# Patient Record
Sex: FEMALE | Race: WHITE | NOT HISPANIC OR LATINO | Employment: STUDENT | URBAN - METROPOLITAN AREA
[De-identification: names, ages, dates, MRNs, and addresses within clinical notes are randomized per-mention and may not be internally consistent; named-entity substitution may affect disease eponyms.]

---

## 2023-08-10 ENCOUNTER — HOSPITAL ENCOUNTER (OUTPATIENT)
Dept: DATA CONVERSION | Facility: HOSPITAL | Age: 23
Discharge: HOME | End: 2023-08-10

## 2023-08-10 DIAGNOSIS — N92.0 EXCESSIVE AND FREQUENT MENSTRUATION WITH REGULAR CYCLE: ICD-10-CM

## 2023-09-11 ENCOUNTER — HOSPITAL ENCOUNTER (OUTPATIENT)
Dept: DATA CONVERSION | Facility: HOSPITAL | Age: 23
Discharge: HOME | End: 2023-09-11
Payer: COMMERCIAL

## 2023-09-11 DIAGNOSIS — E55.9 VITAMIN D DEFICIENCY, UNSPECIFIED: ICD-10-CM

## 2023-09-11 DIAGNOSIS — Z13.220 ENCOUNTER FOR SCREENING FOR LIPOID DISORDERS: ICD-10-CM

## 2023-09-11 DIAGNOSIS — Z13.1 ENCOUNTER FOR SCREENING FOR DIABETES MELLITUS: ICD-10-CM

## 2023-09-11 LAB
25(OH)D3 SERPL-MCNC: 22 NG/ML (ref 31–100)
ALBUMIN SERPL-MCNC: 4.7 GM/DL (ref 3.5–5)
ALBUMIN/GLOB SERPL: 2 RATIO (ref 1.5–3)
ALP BLD-CCNC: 66 U/L (ref 35–125)
ALT SERPL-CCNC: 8 U/L (ref 5–40)
ANION GAP SERPL CALCULATED.3IONS-SCNC: 15 MMOL/L (ref 0–19)
APPEARANCE PLAS: CLEAR
AST SERPL-CCNC: 14 U/L (ref 5–40)
BILIRUB SERPL-MCNC: 0.5 MG/DL (ref 0.1–1.2)
BUN SERPL-MCNC: 8 MG/DL (ref 8–25)
BUN/CREAT SERPL: 11.4 RATIO (ref 8–21)
CALCIUM SERPL-MCNC: 9.5 MG/DL (ref 8.5–10.4)
CHLORIDE SERPL-SCNC: 105 MMOL/L (ref 97–107)
CHOLEST SERPL-MCNC: 143 MG/DL (ref 133–200)
CHOLEST/HDLC SERPL: 3.7 RATIO
CO2 SERPL-SCNC: 21 MMOL/L (ref 24–31)
COLOR SPUN FLD: YELLOW
CREAT SERPL-MCNC: 0.7 MG/DL (ref 0.4–1.6)
FASTING STATUS PATIENT QL REPORTED: ABNORMAL
GFR SERPL CREATININE-BSD FRML MDRD: 125 ML/MIN/1.73 M2
GLOBULIN SER-MCNC: 2.3 G/DL (ref 1.9–3.7)
GLUCOSE SERPL-MCNC: 92 MG/DL (ref 65–99)
HDLC SERPL-MCNC: 39 MG/DL
LDLC SERPL CALC-MCNC: 89 MG/DL (ref 65–130)
POTASSIUM SERPL-SCNC: 4.5 MMOL/L (ref 3.4–5.1)
PROT SERPL-MCNC: 7 G/DL (ref 5.9–7.9)
SODIUM SERPL-SCNC: 140 MMOL/L (ref 133–145)
TRIGL SERPL-MCNC: 75 MG/DL (ref 40–150)

## 2023-10-18 PROBLEM — N92.0 MENORRHAGIA WITH REGULAR CYCLE: Status: ACTIVE | Noted: 2023-10-18

## 2023-10-18 PROBLEM — F41.1 GAD (GENERALIZED ANXIETY DISORDER): Status: ACTIVE | Noted: 2023-10-18

## 2023-10-18 PROBLEM — E55.9 VITAMIN D DEFICIENCY: Status: ACTIVE | Noted: 2023-10-18

## 2023-10-18 RX ORDER — OXYCODONE AND ACETAMINOPHEN 5; 325 MG/1; MG/1
TABLET ORAL
COMMUNITY
Start: 2023-05-24 | End: 2023-10-19 | Stop reason: ALTCHOICE

## 2023-10-18 RX ORDER — TRETINOIN 0.25 MG/G
CREAM TOPICAL
COMMUNITY
Start: 2023-07-25 | End: 2024-05-13

## 2023-10-18 RX ORDER — SERTRALINE HYDROCHLORIDE 25 MG/1
TABLET, FILM COATED ORAL
COMMUNITY
Start: 2023-08-02 | End: 2023-10-19 | Stop reason: ALTCHOICE

## 2023-10-18 RX ORDER — SERTRALINE HYDROCHLORIDE 50 MG/1
TABLET, FILM COATED ORAL
COMMUNITY
Start: 2023-08-02 | End: 2023-12-28

## 2023-10-18 RX ORDER — IBUPROFEN 800 MG/1
TABLET ORAL
COMMUNITY
Start: 2023-05-24 | End: 2023-10-19 | Stop reason: ALTCHOICE

## 2023-10-18 RX ORDER — DIAZEPAM 10 MG/1
TABLET ORAL
COMMUNITY
Start: 2023-05-24 | End: 2023-10-19 | Stop reason: ALTCHOICE

## 2023-10-19 ENCOUNTER — OFFICE VISIT (OUTPATIENT)
Dept: PRIMARY CARE | Facility: CLINIC | Age: 23
End: 2023-10-19
Payer: COMMERCIAL

## 2023-10-19 VITALS
DIASTOLIC BLOOD PRESSURE: 68 MMHG | BODY MASS INDEX: 29.23 KG/M2 | SYSTOLIC BLOOD PRESSURE: 124 MMHG | HEIGHT: 63 IN | HEART RATE: 87 BPM | OXYGEN SATURATION: 98 % | WEIGHT: 165 LBS

## 2023-10-19 DIAGNOSIS — R10.30 LOWER ABDOMINAL PAIN: Primary | ICD-10-CM

## 2023-10-19 PROCEDURE — 1036F TOBACCO NON-USER: CPT

## 2023-10-19 PROCEDURE — 99213 OFFICE O/P EST LOW 20 MIN: CPT

## 2023-10-19 ASSESSMENT — PATIENT HEALTH QUESTIONNAIRE - PHQ9
2. FEELING DOWN, DEPRESSED OR HOPELESS: NOT AT ALL
1. LITTLE INTEREST OR PLEASURE IN DOING THINGS: NOT AT ALL
SUM OF ALL RESPONSES TO PHQ9 QUESTIONS 1 AND 2: 0

## 2023-10-19 ASSESSMENT — ENCOUNTER SYMPTOMS
HEMATURIA: 0
FEVER: 0
ABDOMINAL PAIN: 1
VOMITING: 0
DYSURIA: 0
FREQUENCY: 0
CONSTIPATION: 0
DIARRHEA: 1
NAUSEA: 0

## 2023-10-19 ASSESSMENT — PAIN SCALES - GENERAL: PAINLEVEL: 0-NO PAIN

## 2023-10-19 NOTE — PROGRESS NOTES
"Subjective   Patient ID: Emelina Parmar is a 22 y.o. female who presents for Abdominal Pain (Pt c/o sharp abdominal pain since Sunday with bowel movement or urination /la).    Hx of ONEAL.    Abdominal Pain  This is a new problem. The current episode started in the past 7 days. The onset quality is sudden. The problem occurs 2 to 4 times per day. The problem has been gradually improving. The pain is located in the LUQ, LLQ and suprapubic region. The pain is at a severity of 6/10. The quality of the pain is sharp. Associated symptoms include diarrhea. Pertinent negatives include no constipation, dysuria, fever, frequency, hematuria, nausea or vomiting. The pain is aggravated by urination and bowel movement. The pain is relieved by Bowel movements and urination. She has tried nothing for the symptoms.          Review of Systems   Constitutional:  Negative for fever.   Gastrointestinal:  Positive for abdominal pain and diarrhea. Negative for constipation, nausea and vomiting.   Genitourinary:  Negative for dysuria, frequency and hematuria.       Objective   /68   Pulse 87   Ht 1.6 m (5' 3\")   Wt 74.8 kg (165 lb)   SpO2 98%   BMI 29.23 kg/m²     Physical Exam  Constitutional:       Appearance: Normal appearance.   Cardiovascular:      Rate and Rhythm: Normal rate and regular rhythm.      Heart sounds: No murmur heard.  Pulmonary:      Effort: Pulmonary effort is normal.      Breath sounds: Normal breath sounds. No wheezing, rhonchi or rales.   Abdominal:      General: Abdomen is flat. Bowel sounds are normal.      Palpations: Abdomen is soft. There is no hepatomegaly, splenomegaly or mass.      Tenderness: There is abdominal tenderness in the right lower quadrant and left lower quadrant. There is no right CVA tenderness, left CVA tenderness, guarding or rebound. Negative signs include Bell's sign, Rovsing's sign, McBurney's sign, psoas sign and obturator sign.   Skin:     General: Skin is warm and dry.      " Findings: No rash.   Neurological:      Mental Status: She is alert.   Psychiatric:         Mood and Affect: Mood and affect normal.         Assessment/Plan   Diagnoses and all orders for this visit:  Lower abdominal pain  Discussed potential IBS-D and stress management and diet changes to help. Since symptoms improving patient decline dicyclomine right now, will call back if she changes her mind. Given information packet on IBS and FODMAP diet.

## 2023-10-24 ENCOUNTER — OFFICE VISIT (OUTPATIENT)
Dept: DERMATOLOGY | Facility: CLINIC | Age: 23
End: 2023-10-24
Payer: COMMERCIAL

## 2023-10-24 DIAGNOSIS — L70.0 ACNE VULGARIS: Primary | ICD-10-CM

## 2023-10-24 PROCEDURE — 1036F TOBACCO NON-USER: CPT | Performed by: NURSE PRACTITIONER

## 2023-10-24 PROCEDURE — 99213 OFFICE O/P EST LOW 20 MIN: CPT | Performed by: NURSE PRACTITIONER

## 2023-10-24 NOTE — PROGRESS NOTES
Subjective     Emelina Parmar is a 22 y.o. female who presents for the following: Acne (3 month follow up face.).     Review of Systems:  No other skin or systemic complaints other than what is documented elsewhere in the note.    The following portions of the chart were reviewed this encounter and updated as appropriate:   Tobacco  Allergies  Meds  Problems  Med Hx  Surg Hx         Skin Cancer History  No skin cancer on file.      Specialty Problems    None       Objective   Well appearing patient in no apparent distress; mood and affect are within normal limits.    A focused skin examination was performed. All findings within normal limits unless otherwise noted below.    Assessment/Plan   1. Acne vulgaris  Head - Anterior (Face)  Mixture of open and closed comedomes with scattered few papulopustules.     This is a 22 y.o. female  following up for acne. Patient tolerating tretinoin 0.025% cream at bedtime. Has been using daily for 3-4 weeks. 50% improvement compared to baseline. Some dryness and burning sometimes felt but controlled with non comedogenic moisturizing cream applied prior to tretinoin. Continue with current plan given improvement can be seen up to 6 months after starting tretinoin. Patient in agreement with not adjusting meds given hx of very sensitive skin.                Related Procedures  Follow Up In Dermatology - Established Patient

## 2023-12-24 DIAGNOSIS — F41.1 GENERALIZED ANXIETY DISORDER: ICD-10-CM

## 2023-12-28 RX ORDER — SERTRALINE HYDROCHLORIDE 50 MG/1
TABLET, FILM COATED ORAL
Qty: 90 TABLET | Refills: 1 | Status: SHIPPED | OUTPATIENT
Start: 2023-12-28 | End: 2024-03-20 | Stop reason: SDUPTHER

## 2024-02-19 ENCOUNTER — TELEPHONE (OUTPATIENT)
Dept: PRIMARY CARE | Facility: CLINIC | Age: 24
End: 2024-02-19
Payer: COMMERCIAL

## 2024-02-19 NOTE — TELEPHONE ENCOUNTER
I'm not entirely sure what she is asking. Would recommend appt to discuss. Can be virtual, but only if she has video capabilities.

## 2024-02-19 NOTE — TELEPHONE ENCOUNTER
Pt called stating since being on 50 mg of Sertraline she feels that the week before her period and the week after, her anxiety is very high.--- She is not interested in dropping the dosage steadily but would like to know if it is okay/ would make a difference if she was to rotate the 25 mg and the 50 mg, or if the dosage should be upped all together.---- she would just like some guidance on what to do

## 2024-02-20 ENCOUNTER — TELEMEDICINE (OUTPATIENT)
Dept: PRIMARY CARE | Facility: CLINIC | Age: 24
End: 2024-02-20
Payer: COMMERCIAL

## 2024-02-20 VITALS — WEIGHT: 150 LBS | HEIGHT: 63 IN | BODY MASS INDEX: 26.58 KG/M2

## 2024-02-20 DIAGNOSIS — F41.1 GAD (GENERALIZED ANXIETY DISORDER): Primary | ICD-10-CM

## 2024-02-20 PROCEDURE — 1036F TOBACCO NON-USER: CPT

## 2024-02-20 PROCEDURE — 99213 OFFICE O/P EST LOW 20 MIN: CPT

## 2024-02-20 RX ORDER — SERTRALINE HYDROCHLORIDE 25 MG/1
25 TABLET, FILM COATED ORAL DAILY
Qty: 90 TABLET | Refills: 0 | Status: SHIPPED | OUTPATIENT
Start: 2024-02-20 | End: 2024-03-20 | Stop reason: SDUPTHER

## 2024-02-20 ASSESSMENT — PATIENT HEALTH QUESTIONNAIRE - PHQ9
SUM OF ALL RESPONSES TO PHQ9 QUESTIONS 1 AND 2: 0
1. LITTLE INTEREST OR PLEASURE IN DOING THINGS: NOT AT ALL
2. FEELING DOWN, DEPRESSED OR HOPELESS: NOT AT ALL

## 2024-02-20 ASSESSMENT — ENCOUNTER SYMPTOMS
FEVER: 0
CHILLS: 0
NERVOUS/ANXIOUS: 1

## 2024-02-20 ASSESSMENT — PAIN SCALES - GENERAL: PAINLEVEL: 0-NO PAIN

## 2024-02-20 NOTE — PROGRESS NOTES
"Subjective   Patient ID: Emelina Parmar is a 23 y.o. female who presents for Follow-up (Discuss meds, increased anxiety).    Anxiety - patient last increased Sertraline to 50 mg 9/2023 and anxiety was improving. However has noticed increased in anxiety the week prior and of menstrual period, and would like to discuss going up on medication. Patient had diarrhea and stomach upset first few weeks with medication increase that resolved. 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 saw Dr. Farooq who rx'd lavender, silica, kava kava. not interested in counseling.       Review of Systems   Constitutional:  Negative for chills and fever.   Psychiatric/Behavioral:  The patient is nervous/anxious.      Objective   Ht 1.6 m (5' 3\")   Wt 68 kg (150 lb)   BMI 26.57 kg/m²     Physical Exam  Constitutional:       Appearance: Normal appearance.   Pulmonary:      Effort: Pulmonary effort is normal.   Skin:     Findings: No rash.   Neurological:      Mental Status: She is alert.   Psychiatric:         Mood and Affect: Mood and affect normal.         Assessment/Plan   Diagnoses and all orders for this visit:  ONEAL (generalized anxiety disorder)  -     sertraline (Zoloft) 25 mg tablet; Take 1 tablet (25 mg) by mouth once daily. To take in addition to 50 mg sertraline  Increase Sertraline to 75 mg. Discussed increase dose just around menstrual period, but due to sensitive stomach, SDM to have steady daily dose instead of switching back and forth on dosage. Follow-up 4 weeks.        "

## 2024-03-20 ENCOUNTER — TELEMEDICINE (OUTPATIENT)
Dept: PRIMARY CARE | Facility: CLINIC | Age: 24
End: 2024-03-20
Payer: COMMERCIAL

## 2024-03-20 DIAGNOSIS — F41.1 GAD (GENERALIZED ANXIETY DISORDER): ICD-10-CM

## 2024-03-20 DIAGNOSIS — F41.1 GENERALIZED ANXIETY DISORDER: ICD-10-CM

## 2024-03-20 PROCEDURE — 1036F TOBACCO NON-USER: CPT

## 2024-03-20 PROCEDURE — 99213 OFFICE O/P EST LOW 20 MIN: CPT

## 2024-03-20 RX ORDER — SERTRALINE HYDROCHLORIDE 25 MG/1
25 TABLET, FILM COATED ORAL DAILY
Qty: 90 TABLET | Refills: 1 | Status: SHIPPED | OUTPATIENT
Start: 2024-03-20 | End: 2024-09-16

## 2024-03-20 RX ORDER — SERTRALINE HYDROCHLORIDE 50 MG/1
50 TABLET, FILM COATED ORAL DAILY
Qty: 90 TABLET | Refills: 1 | Status: SHIPPED | OUTPATIENT
Start: 2024-03-20

## 2024-03-20 ASSESSMENT — ENCOUNTER SYMPTOMS
NERVOUS/ANXIOUS: 0
FEVER: 0
DYSPHORIC MOOD: 0
CHILLS: 0

## 2024-03-20 NOTE — PROGRESS NOTES
"Subjective   Patient ID: Emelina Parmar is a 23 y.o. female who presents for No chief complaint on file..    With patient's permission, this is a Telemedicine visit with video and audio. All issues as documented below were discussed and addressed but limited physical exam was performed. If it was felt that the patient should be evaluated via face-to-face office appointment(s) they were directed there.       Anxiety - patient last visit increased Sertraline to 75 mg after noticing increase in anxiety around menstrual period. Patient states \"felt better\" this menstrual period, did not even notice mood fluctuate week prior to period like usual. No GI symptoms with increased dose. Mood last few weeks described \"improved.\" Has been able to handle stressful projects at school calmer than usual. 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 not interested in counseling.       Review of Systems   Constitutional:  Negative for chills and fever.   Psychiatric/Behavioral:  Negative for dysphoric mood and suicidal ideas. The patient is not nervous/anxious.        Objective   There were no vitals taken for this visit.    Physical Exam  Constitutional:       Appearance: Normal appearance.   Pulmonary:      Effort: Pulmonary effort is normal.   Skin:     Findings: No rash.   Neurological:      Mental Status: She is alert.   Psychiatric:         Mood and Affect: Mood and affect normal.         Assessment/Plan   Diagnoses and all orders for this visit:  Generalized anxiety disorder  -     sertraline (Zoloft) 50 mg tablet; Take 1 tablet (50 mg) by mouth once daily.  ONEAL (generalized anxiety disorder)  -     sertraline (Zoloft) 25 mg tablet; Take 1 tablet (25 mg) by mouth once daily. To take in addition to 50 mg sertraline  Follow-up 6 months for anxiety.      "

## 2024-04-23 ENCOUNTER — OFFICE VISIT (OUTPATIENT)
Dept: DERMATOLOGY | Facility: CLINIC | Age: 24
End: 2024-04-23
Payer: COMMERCIAL

## 2024-04-23 DIAGNOSIS — L70.0 ACNE VULGARIS: Primary | ICD-10-CM

## 2024-04-23 PROCEDURE — 1036F TOBACCO NON-USER: CPT | Performed by: NURSE PRACTITIONER

## 2024-04-23 PROCEDURE — 99213 OFFICE O/P EST LOW 20 MIN: CPT | Performed by: NURSE PRACTITIONER

## 2024-04-23 NOTE — PROGRESS NOTES
Subjective     Emelina Parmar is a 23 y.o. female who presents for the following: Acne.     Review of Systems:  No other skin or systemic complaints other than what is documented elsewhere in the note.    The following portions of the chart were reviewed this encounter and updated as appropriate:   Tobacco  Allergies  Meds  Problems  Med Hx  Surg Hx         Skin Cancer History  No skin cancer on file.      Specialty Problems    None       Objective   Well appearing patient in no apparent distress; mood and affect are within normal limits.    A focused skin examination was performed neck up. All findings within normal limits unless otherwise noted below.    Assessment/Plan   1. Acne vulgaris  Head - Anterior (Face)  Mild mixture of open and closed comedomes with scattered papulopustules, mostly to the perioral inferior medial cheek area.     This is a 23 y.o. female  following up for acne. Compared to baseline, much better but continues to have issues, especially around her menstrual cycle. She is not interested in BCP at this time. Tolerating tretinoin for the most part but will apply for 3 days in a row and have to break. On average applying tretinoin 4-5 times a week.     PLAN    PM - Wash with SS wash. Rinse after 30-60 seconds initially and gradually increase dwell time up to 5 minutes. If tolerating well and continues to have acne, may start SS wash in the morning. She will apply tretinoin 0.025% cream every other day given reports of skin irritation.     Related Procedures  Follow Up In Dermatology - Established Patient        Return in 6 months for recheck or return to clinic sooner if needed

## 2024-05-13 ENCOUNTER — TELEPHONE (OUTPATIENT)
Dept: DERMATOLOGY | Facility: CLINIC | Age: 24
End: 2024-05-13
Payer: COMMERCIAL

## 2024-05-13 DIAGNOSIS — L70.0 ACNE VULGARIS: Primary | ICD-10-CM

## 2024-05-13 RX ORDER — SULFACETAMIDE SODIUM, SULFUR 100; 50 MG/G; MG/G
EMULSION TOPICAL
Qty: 340 G | Refills: 3 | Status: SHIPPED | OUTPATIENT
Start: 2024-05-13

## 2024-05-13 RX ORDER — TRETINOIN 0.25 MG/G
CREAM TOPICAL
Qty: 45 G | Refills: 3 | Status: SHIPPED | OUTPATIENT
Start: 2024-05-13

## 2024-05-30 ENCOUNTER — OFFICE VISIT (OUTPATIENT)
Dept: PRIMARY CARE | Facility: CLINIC | Age: 24
End: 2024-05-30
Payer: COMMERCIAL

## 2024-05-30 VITALS
SYSTOLIC BLOOD PRESSURE: 118 MMHG | WEIGHT: 153.8 LBS | BODY MASS INDEX: 27.25 KG/M2 | HEART RATE: 78 BPM | HEIGHT: 63 IN | OXYGEN SATURATION: 99 % | DIASTOLIC BLOOD PRESSURE: 78 MMHG

## 2024-05-30 DIAGNOSIS — H69.93 EUSTACHIAN TUBE DYSFUNCTION, BILATERAL: Primary | ICD-10-CM

## 2024-05-30 DIAGNOSIS — H61.21 EXCESSIVE CERUMEN IN RIGHT EAR CANAL: ICD-10-CM

## 2024-05-30 PROCEDURE — 99213 OFFICE O/P EST LOW 20 MIN: CPT

## 2024-05-30 PROCEDURE — 1036F TOBACCO NON-USER: CPT

## 2024-05-30 PROCEDURE — 69210 REMOVE IMPACTED EAR WAX UNI: CPT

## 2024-05-30 ASSESSMENT — ENCOUNTER SYMPTOMS
FEVER: 0
COUGH: 0
SORE THROAT: 0
CHILLS: 0
RHINORRHEA: 0

## 2024-05-30 ASSESSMENT — PAIN SCALES - GENERAL: PAINLEVEL: 4

## 2024-05-30 ASSESSMENT — PATIENT HEALTH QUESTIONNAIRE - PHQ9
1. LITTLE INTEREST OR PLEASURE IN DOING THINGS: NOT AT ALL
SUM OF ALL RESPONSES TO PHQ9 QUESTIONS 1 AND 2: 0
2. FEELING DOWN, DEPRESSED OR HOPELESS: NOT AT ALL

## 2024-05-30 NOTE — PROGRESS NOTES
"Subjective   Patient ID: Emelina Parmar is a 23 y.o. female who presents for Ear Fullness.    Ear fullness x 1-2 months. Both ears are bothersome. +Pruritus. Has had ear lavage done in the past. Denies ear pain, ear drainage, hearing loss, rhinorrhea, fever, chills, congestion, cough PND.      Review of Systems   Constitutional:  Negative for chills and fever.   HENT:  Negative for ear discharge, ear pain, hearing loss, postnasal drip, rhinorrhea and sore throat.    Respiratory:  Negative for cough.        Objective   /78   Pulse 78   Ht 1.6 m (5' 3\")   Wt 69.8 kg (153 lb 12.8 oz)   SpO2 99%   BMI 27.24 kg/m²     Physical Exam  Constitutional:       Appearance: Normal appearance.   HENT:      Right Ear: Tympanic membrane and ear canal normal.      Left Ear: Tympanic membrane and ear canal normal.      Ears:      Comments: Mild-Moderate dry ear wax of right ear canal, TM still visible  Pulmonary:      Effort: Pulmonary effort is normal.   Skin:     Findings: No rash.   Neurological:      Mental Status: She is alert.   Psychiatric:         Mood and Affect: Mood and affect normal.       Patient ID: Emelina Parmar is a 23 y.o. female.    Ear Cerumen Removal    Date/Time: 5/30/2024 2:03 PM    Performed by: Majo Vanessa PA-C  Authorized by: Majo Vanessa PA-C    Consent:     Consent obtained:  Verbal    Consent given by:  Patient    Risks, benefits, and alternatives were discussed: yes      Risks discussed:  Incomplete removal    Alternatives discussed:  No treatment  Universal protocol:     Procedure explained and questions answered to patient or proxy's satisfaction: yes      Site/side marked: yes      Immediately prior to procedure, a time out was called: yes      Patient identity confirmed:  Verbally with patient  Procedure details:     Location:  R ear    Procedure type: curette      Procedure outcomes: cerumen removed    Post-procedure details:     Inspection:  Ear canal clear and TM intact    " Hearing quality:  Normal    Procedure completion:  Tolerated well, no immediate complications      Assessment/Plan   Diagnoses and all orders for this visit:  Eustachian tube dysfunction, bilateral  Excessive cerumen in right ear canal  -     Ear Cerumen Removal  -Informed to use Flonase daily for two weeks to relieve ear fullness and popping sensation. Also use OTC mineral ear oil drops for ear pruritus.

## 2024-08-08 ENCOUNTER — APPOINTMENT (OUTPATIENT)
Dept: PRIMARY CARE | Facility: CLINIC | Age: 24
End: 2024-08-08
Payer: COMMERCIAL

## 2024-08-17 DIAGNOSIS — F41.1 GENERALIZED ANXIETY DISORDER: ICD-10-CM

## 2024-08-19 ENCOUNTER — PATIENT MESSAGE (OUTPATIENT)
Dept: PRIMARY CARE | Facility: CLINIC | Age: 24
End: 2024-08-19
Payer: COMMERCIAL

## 2024-08-19 DIAGNOSIS — F41.1 GAD (GENERALIZED ANXIETY DISORDER): ICD-10-CM

## 2024-08-19 RX ORDER — SERTRALINE HYDROCHLORIDE 50 MG/1
50 TABLET, FILM COATED ORAL DAILY
Qty: 90 TABLET | Refills: 1 | Status: SHIPPED | OUTPATIENT
Start: 2024-08-19

## 2024-08-20 RX ORDER — SERTRALINE HYDROCHLORIDE 25 MG/1
25 TABLET, FILM COATED ORAL DAILY
Qty: 90 TABLET | Refills: 1 | Status: SHIPPED | OUTPATIENT
Start: 2024-08-20 | End: 2025-02-16

## 2024-09-19 ENCOUNTER — APPOINTMENT (OUTPATIENT)
Dept: PRIMARY CARE | Facility: CLINIC | Age: 24
End: 2024-09-19
Payer: COMMERCIAL

## 2024-09-19 ENCOUNTER — OFFICE VISIT (OUTPATIENT)
Dept: PRIMARY CARE | Facility: CLINIC | Age: 24
End: 2024-09-19
Payer: COMMERCIAL

## 2024-09-19 VITALS
BODY MASS INDEX: 27.95 KG/M2 | SYSTOLIC BLOOD PRESSURE: 120 MMHG | OXYGEN SATURATION: 98 % | HEART RATE: 84 BPM | WEIGHT: 157.8 LBS | DIASTOLIC BLOOD PRESSURE: 76 MMHG

## 2024-09-19 DIAGNOSIS — F41.1 GAD (GENERALIZED ANXIETY DISORDER): ICD-10-CM

## 2024-09-19 DIAGNOSIS — N83.201 CYSTS OF BOTH OVARIES: Primary | ICD-10-CM

## 2024-09-19 DIAGNOSIS — N83.202 CYSTS OF BOTH OVARIES: Primary | ICD-10-CM

## 2024-09-19 DIAGNOSIS — M71.22 BAKER'S CYST OF KNEE, LEFT: ICD-10-CM

## 2024-09-19 DIAGNOSIS — R10.31 RLQ ABDOMINAL PAIN: ICD-10-CM

## 2024-09-19 PROCEDURE — 99214 OFFICE O/P EST MOD 30 MIN: CPT | Performed by: PHYSICIAN ASSISTANT

## 2024-09-19 RX ORDER — SERTRALINE HYDROCHLORIDE 100 MG/1
100 TABLET, FILM COATED ORAL DAILY
Qty: 30 TABLET | Refills: 1 | Status: SHIPPED | OUTPATIENT
Start: 2024-09-19 | End: 2024-11-18

## 2024-09-19 ASSESSMENT — ENCOUNTER SYMPTOMS
DIARRHEA: 1
DYSPHORIC MOOD: 1
NERVOUS/ANXIOUS: 1
SHORTNESS OF BREATH: 0
ABDOMINAL PAIN: 1
NAUSEA: 1
BLOOD IN STOOL: 0
CONSTIPATION: 0
FEVER: 0
ARTHRALGIAS: 1

## 2024-09-19 ASSESSMENT — COLUMBIA-SUICIDE SEVERITY RATING SCALE - C-SSRS
1. IN THE PAST MONTH, HAVE YOU WISHED YOU WERE DEAD OR WISHED YOU COULD GO TO SLEEP AND NOT WAKE UP?: NO
6. HAVE YOU EVER DONE ANYTHING, STARTED TO DO ANYTHING, OR PREPARED TO DO ANYTHING TO END YOUR LIFE?: NO
2. HAVE YOU ACTUALLY HAD ANY THOUGHTS OF KILLING YOURSELF?: NO

## 2024-09-19 ASSESSMENT — PAIN SCALES - GENERAL: PAINLEVEL: 0-NO PAIN

## 2024-09-19 NOTE — PROGRESS NOTES
Subjective   Patient ID: Emelina Parmar is a 23 y.o. female who presents for Medication Reaction (Pt is here for medication follow up, pt would like a refill on sertraline 50mg /nr), Pelvic Pain (Pt is here for pelvic pain, which has been worse during ovulation, mostly on the right side / nr), and Knee Pain (Pt has been having left knee pain, for months ).    HPI   RLQ abd pain - ongoing x 2mos. Worst during ovulation (typically 3 days) and with full bladder. Feels a pulling sensation when she leans back. Has had pelvic US in the past with bilateral follicular cysts but none significantly enlarged. Mom w/ovarian cysts, no FamHx endometriosis. Has never been sexually active. She is not interested in OCP.     Anxiety - started on sertraline in the past, titrated up to 75mg. Feels like anxiety is worst the week before her period. She had discussed potentially increasing the medication around her period but experiences diarrhea w/dose changes and is hesitant to start/stop dose changes. Has tried naturopathic treatment in the past from Dr. Oseas leong, silica, kava kava. She does feel significantly better on sertraline and would like to increase it.    L posterior knee numbness. Has caused her to fall a handful of times in the past 2 years. When she squats and puts pressure on the knee, it feels like it's going to snap. No hx of injury to the knee. Played basketball + softball in the past.     Earwax buildup - hx of cerumen impaction, requesting physical exam of b/l ears    Review of Systems   Constitutional:  Negative for fever.   Respiratory:  Negative for shortness of breath.    Cardiovascular:  Negative for chest pain.   Gastrointestinal:  Positive for abdominal pain (RLQ), diarrhea (loose stool) and nausea. Negative for blood in stool and constipation.   Genitourinary:  Positive for menstrual problem and pelvic pain.   Musculoskeletal:  Positive for arthralgias.   Skin:  Negative for rash.    Psychiatric/Behavioral:  Positive for dysphoric mood. The patient is nervous/anxious.        Objective   /76   Pulse 84   Wt 71.6 kg (157 lb 12.8 oz)   SpO2 98%   BMI 27.95 kg/m²     Physical Exam  Constitutional:       Appearance: Normal appearance.   HENT:      Head: Normocephalic and atraumatic.      Right Ear: Tympanic membrane and ear canal normal.      Left Ear: Tympanic membrane and ear canal normal.      Ears:      Comments: Scant cerumen b/l EAC  Eyes:      Extraocular Movements: Extraocular movements intact.      Conjunctiva/sclera: Conjunctivae normal.   Cardiovascular:      Rate and Rhythm: Normal rate and regular rhythm.      Heart sounds: Normal heart sounds.   Pulmonary:      Effort: Pulmonary effort is normal.      Breath sounds: Normal breath sounds. No wheezing or rhonchi.   Abdominal:      General: Abdomen is flat. Bowel sounds are normal. There is no distension.      Palpations: Abdomen is soft. There is no mass.   Musculoskeletal:      Cervical back: Normal range of motion and neck supple.      Comments: L posterior knee fullness, ROM nml, no pain w/resisted flexion/extension. Negative AP drawer tests.   Skin:     General: Skin is warm.      Findings: No rash.   Neurological:      General: No focal deficit present.      Mental Status: She is alert.   Psychiatric:         Mood and Affect: Mood normal.         Behavior: Behavior normal.         Assessment/Plan   Problem List Items Addressed This Visit             ICD-10-CM    ONEAL (generalized anxiety disorder) F41.1    Relevant Medications    sertraline (Zoloft) 100 mg tablet     Other Visit Diagnoses         Codes    Cysts of both ovaries    -  Primary N83.201, N83.202    Relevant Orders    US PELVIS TRANSABDOMINAL WITH TRANSVAGINAL    Referral to Obstetrics / Gynecology    RLQ abdominal pain     R10.31    Relevant Orders    US PELVIS TRANSABDOMINAL WITH TRANSVAGINAL    Referral to Obstetrics / Gynecology    Baker's cyst of knee, left      M71.22    Relevant Orders    Referral to Sports Medicine          L knee - suspect baker's cyst given fullness behind the knee and description of sxs. Recommend further eval by sports med    ONEAL - w/shared decision making we decided to increase sertraline to 100mg as opposed to using buspar PRN during premenstrual phase as buspar may cause dizziness and she already experiences occasional dizziness.    RLQ pain - suspect this is  in nature and may potentially be endometriosis. Will repeat pelvic US as RLQ pain is new. Will attempt transvaginal imaging but pt has never been sexually active and I recommend she stop the US tech if she is experiencing pain. We will have her see gynecology for further workup/treatment but if no improvement, will pursue GI workup including elimination diet.

## 2024-09-20 PROCEDURE — 90471 IMMUNIZATION ADMIN: CPT | Performed by: PHYSICIAN ASSISTANT

## 2024-09-20 PROCEDURE — 90656 IIV3 VACC NO PRSV 0.5 ML IM: CPT | Performed by: PHYSICIAN ASSISTANT

## 2024-10-11 ENCOUNTER — HOSPITAL ENCOUNTER (OUTPATIENT)
Dept: RADIOLOGY | Facility: HOSPITAL | Age: 24
Discharge: HOME | End: 2024-10-11
Payer: COMMERCIAL

## 2024-10-11 DIAGNOSIS — R10.31 RLQ ABDOMINAL PAIN: ICD-10-CM

## 2024-10-11 DIAGNOSIS — N83.202 CYSTS OF BOTH OVARIES: ICD-10-CM

## 2024-10-11 DIAGNOSIS — N83.201 CYSTS OF BOTH OVARIES: ICD-10-CM

## 2024-10-11 PROCEDURE — 76856 US EXAM PELVIC COMPLETE: CPT

## 2024-10-14 ENCOUNTER — TELEPHONE (OUTPATIENT)
Dept: PRIMARY CARE | Facility: CLINIC | Age: 24
End: 2024-10-14
Payer: COMMERCIAL

## 2024-10-14 ENCOUNTER — PATIENT MESSAGE (OUTPATIENT)
Dept: PRIMARY CARE | Facility: CLINIC | Age: 24
End: 2024-10-14
Payer: COMMERCIAL

## 2024-10-14 NOTE — TELEPHONE ENCOUNTER
Please let pt know that ultrasound was essentially normal and does not explain current symptoms. I suspect this may be endometriosis and would recommend she discuss further w/women's health specialist.

## 2024-10-15 NOTE — TELEPHONE ENCOUNTER
Called and spoke with pt, who verbally understands. Pt states that this cycle she does not feel the abdominal pain. However, pt has made an appt with Dr. Burleson in Nov.

## 2024-10-25 ENCOUNTER — OFFICE VISIT (OUTPATIENT)
Dept: SPORTS MEDICINE | Facility: CLINIC | Age: 24
End: 2024-10-25
Payer: COMMERCIAL

## 2024-10-25 ENCOUNTER — APPOINTMENT (OUTPATIENT)
Dept: DERMATOLOGY | Facility: CLINIC | Age: 24
End: 2024-10-25
Payer: COMMERCIAL

## 2024-10-25 ENCOUNTER — HOSPITAL ENCOUNTER (OUTPATIENT)
Dept: RADIOLOGY | Facility: CLINIC | Age: 24
Discharge: HOME | End: 2024-10-25
Payer: COMMERCIAL

## 2024-10-25 VITALS
WEIGHT: 157 LBS | SYSTOLIC BLOOD PRESSURE: 110 MMHG | BODY MASS INDEX: 27.82 KG/M2 | HEART RATE: 70 BPM | DIASTOLIC BLOOD PRESSURE: 70 MMHG | HEIGHT: 63 IN

## 2024-10-25 DIAGNOSIS — M21.072 VALGUS FOOT DEFORMITY, ACQUIRED, LEFT: ICD-10-CM

## 2024-10-25 DIAGNOSIS — M25.462 EFFUSION OF LEFT KNEE: ICD-10-CM

## 2024-10-25 DIAGNOSIS — M71.22 BAKER'S CYST OF KNEE, LEFT: ICD-10-CM

## 2024-10-25 DIAGNOSIS — M21.70 LEG LENGTH DIFFERENCE, ACQUIRED: ICD-10-CM

## 2024-10-25 DIAGNOSIS — M22.2X2 PATELLOFEMORAL PAIN SYNDROME OF LEFT KNEE: ICD-10-CM

## 2024-10-25 DIAGNOSIS — L70.0 ACNE VULGARIS: ICD-10-CM

## 2024-10-25 DIAGNOSIS — S83.242A ACUTE MEDIAL MENISCAL TEAR, LEFT, INITIAL ENCOUNTER: ICD-10-CM

## 2024-10-25 DIAGNOSIS — M25.562 ACUTE PAIN OF LEFT KNEE: ICD-10-CM

## 2024-10-25 PROCEDURE — 3008F BODY MASS INDEX DOCD: CPT | Performed by: FAMILY MEDICINE

## 2024-10-25 PROCEDURE — 99204 OFFICE O/P NEW MOD 45 MIN: CPT | Performed by: FAMILY MEDICINE

## 2024-10-25 PROCEDURE — 72170 X-RAY EXAM OF PELVIS: CPT

## 2024-10-25 PROCEDURE — 99214 OFFICE O/P EST MOD 30 MIN: CPT | Performed by: FAMILY MEDICINE

## 2024-10-25 PROCEDURE — 73564 X-RAY EXAM KNEE 4 OR MORE: CPT | Mod: LT

## 2024-10-25 PROCEDURE — 99213 OFFICE O/P EST LOW 20 MIN: CPT | Performed by: NURSE PRACTITIONER

## 2024-10-25 PROCEDURE — 1036F TOBACCO NON-USER: CPT | Performed by: NURSE PRACTITIONER

## 2024-10-25 ASSESSMENT — DERMATOLOGY QUALITY OF LIFE (QOL) ASSESSMENT
RATE HOW EMOTIONALLY BOTHERED YOU ARE BY YOUR SKIN PROBLEM (FOR EXAMPLE, WORRY, EMBARRASSMENT, FRUSTRATION): 1
RATE HOW BOTHERED YOU ARE BY SYMPTOMS OF YOUR SKIN PROBLEM (EG, ITCHING, STINGING BURNING, HURTING OR SKIN IRRITATION): 3
RATE HOW BOTHERED YOU ARE BY EFFECTS OF YOUR SKIN PROBLEMS ON YOUR ACTIVITIES (EG, GOING OUT, ACCOMPLISHING WHAT YOU WANT, WORK ACTIVITIES OR YOUR RELATIONSHIPS WITH OTHERS): 0 - NEVER BOTHERED
WHAT SINGLE SKIN CONDITION LISTED BELOW IS THE PATIENT ANSWERING THE QUALITY-OF-LIFE ASSESSMENT QUESTIONS ABOUT: ACNE
RATE HOW EMOTIONALLY BOTHERED YOU ARE BY YOUR SKIN PROBLEM (FOR EXAMPLE, WORRY, EMBARRASSMENT, FRUSTRATION): 1
RATE HOW BOTHERED YOU ARE BY SYMPTOMS OF YOUR SKIN PROBLEM (EG, ITCHING, STINGING BURNING, HURTING OR SKIN IRRITATION): 3
RATE HOW BOTHERED YOU ARE BY EFFECTS OF YOUR SKIN PROBLEMS ON YOUR ACTIVITIES (EG, GOING OUT, ACCOMPLISHING WHAT YOU WANT, WORK ACTIVITIES OR YOUR RELATIONSHIPS WITH OTHERS): 0 - NEVER BOTHERED
WHAT SINGLE SKIN CONDITION LISTED BELOW IS THE PATIENT ANSWERING THE QUALITY-OF-LIFE ASSESSMENT QUESTIONS ABOUT: ACNE

## 2024-10-25 ASSESSMENT — ENCOUNTER SYMPTOMS: SHORTNESS OF BREATH: 0

## 2024-10-25 ASSESSMENT — PAIN DESCRIPTION - DESCRIPTORS: DESCRIPTORS: ACHING;SORE;THROBBING;PRESSURE

## 2024-10-25 ASSESSMENT — PAIN SCALES - GENERAL
PAINLEVEL_OUTOF10: 3
PAINLEVEL_OUTOF10: 3

## 2024-10-25 ASSESSMENT — PAIN - FUNCTIONAL ASSESSMENT: PAIN_FUNCTIONAL_ASSESSMENT: 0-10

## 2024-10-25 NOTE — PATIENT INSTRUCTIONS
Continue to alternate ice and moist heat as needed  ,   Start into Physical Therapy 1-2 times a week for 8-10 weeks with manual therapy as well as dry needling and IASTM  , Reviewed home exercises to be performed by the patient routinely  ,   Stressed the importance of wearing shoes with good stability control to help with the biomechanics affecting the knees as well as the lower extremities  ,   Stressed the importance of wearing full foot insoles to help with the biomechanics affecting the knees as well as the lower extremities,   Stressed the importance of wearing full foot insoles to help with the biomechanics affecting the knees as well as the lower extremities  ,   Recommendation over-the-counter calcium with vitamin-D 2 -3000+ milligrams a day, as well as OTC symphytum as directed daily to promote bony healing, in addition to a daily multivitamin.  ,   Recommendation over-the-counter curcumin, turmeric, boswellia, as well as egg shell membrane as directed to aid with joint inflammation.  ,   Recommendation over-the-counter Move Free for joint health.  ,   Patient advised regarding the risks and/or potential adverse reactions and/or side effects of any prescribed medications along with any over-the-counter medications or any supplements used. Patient advised to seek immediate medical care if any adverse reactions occur. The patient and/or patient(s) parent(s) verbalized their understanding,   Discussed in detail with the patient to the level of their understanding the possibility in the future of regenerative injections versus corticosteroid injections versus viscosupplementation injections versus a combination ,   At the patient's next office visit, will provide appropriate __ millimeter heel lift to be placed in the RIGHT/LEFT shoe to accommodate for leg length discrepancy found on standing erect pelvis xray  ,   MRI of the LEFT knees to rule out ligament or tendon injury versus meniscal injury versus stress  fracture versus other  You have been ordered an MRI of the left. Once you contact scheduling at [phone] and obtain the date and time of your MRI, contact our office at [phone] to schedule your follow-up appointment to review your results. Please note the results of your imaging will not be discussed over the telephone.   Follow-up after LEFT knee MRI or in 2-4 weeks for a reevaluation or sooner as needed

## 2024-10-25 NOTE — PROGRESS NOTES
Subjective     Emeilna Parmar is a 23 y.o. female who presents for the following: Acne.     Review of Systems:  No other skin or systemic complaints other than what is documented elsewhere in the note.    The following portions of the chart were reviewed this encounter and updated as appropriate:   Tobacco  Allergies  Meds  Problems  Med Hx  Surg Hx         Skin Cancer History  No skin cancer on file.      Specialty Problems    None       Objective   Well appearing patient in no apparent distress; mood and affect are within normal limits.    A focused skin examination was performed. All findings within normal limits unless otherwise noted below.    Assessment/Plan   1. Acne vulgaris  Head - Anterior (Face)  Scattered open and closed comedomes. A few red papules on the posterior neck area but not the face.     This is a 23 y.o. female  following up for acne. Continues to see improvement. Near clear with only comedomes and some inflammatory lesions on the neck. Advised to start SS Wash on the neck rinse after 3-5 minutes. When using SS wash rinse after 5 minutes, if using tretinoin will cause skin to become red. Will back down to 3 minutes and be more consistent with tretinoin.     PLAN    PM - Wash with SS wash. Rinse after 3 minutes then apply moisturizing cream and then  tretinoin 0.025% cream every other day given reports of skin irritation. May try to work up to 4-5 days of tretinoin a week. She will call for RF.     Related Procedures  Follow Up In Dermatology - Established Patient    Related Medications  sulfacetamide sodium-sulfur (Avar, Plexion) 10-5 % (w/w) topical emulsion  Wash affected areas twice daily rinse after 5 minutes    tretinoin (Retin-A) 0.025 % cream  Apply a thin layer to affected area at bedtime as directed        Return in 6 months for acne or return to clinic sooner if needed

## 2024-10-28 ENCOUNTER — OFFICE VISIT (OUTPATIENT)
Dept: PRIMARY CARE | Facility: CLINIC | Age: 24
End: 2024-10-28
Payer: COMMERCIAL

## 2024-10-28 VITALS
WEIGHT: 160.4 LBS | OXYGEN SATURATION: 99 % | HEART RATE: 86 BPM | SYSTOLIC BLOOD PRESSURE: 122 MMHG | BODY MASS INDEX: 28.41 KG/M2 | DIASTOLIC BLOOD PRESSURE: 74 MMHG

## 2024-10-28 DIAGNOSIS — R10.9 ABDOMINAL PRESSURE: ICD-10-CM

## 2024-10-28 DIAGNOSIS — F41.1 GAD (GENERALIZED ANXIETY DISORDER): Primary | ICD-10-CM

## 2024-10-28 DIAGNOSIS — M25.562 ACUTE PAIN OF LEFT KNEE: ICD-10-CM

## 2024-10-28 LAB
POC APPEARANCE, URINE: CLEAR
POC BILIRUBIN, URINE: NEGATIVE
POC BLOOD, URINE: ABNORMAL
POC COLOR, URINE: YELLOW
POC GLUCOSE, URINE: NEGATIVE MG/DL
POC KETONES, URINE: NEGATIVE MG/DL
POC LEUKOCYTES, URINE: NEGATIVE
POC NITRITE,URINE: NEGATIVE
POC PH, URINE: 6.5 PH
POC PROTEIN, URINE: NEGATIVE MG/DL
POC SPECIFIC GRAVITY, URINE: 1.01
POC UROBILINOGEN, URINE: 0.2 EU/DL

## 2024-10-28 PROCEDURE — 99214 OFFICE O/P EST MOD 30 MIN: CPT | Performed by: PHYSICIAN ASSISTANT

## 2024-10-28 PROCEDURE — 81003 URINALYSIS AUTO W/O SCOPE: CPT | Performed by: PHYSICIAN ASSISTANT

## 2024-10-28 PROCEDURE — 1036F TOBACCO NON-USER: CPT | Performed by: PHYSICIAN ASSISTANT

## 2024-10-28 RX ORDER — SERTRALINE HYDROCHLORIDE 100 MG/1
100 TABLET, FILM COATED ORAL DAILY
Qty: 90 TABLET | Refills: 1 | Status: SHIPPED | OUTPATIENT
Start: 2024-10-28 | End: 2025-04-26

## 2024-10-28 ASSESSMENT — ENCOUNTER SYMPTOMS
HEMATURIA: 0
NAUSEA: 1
FEVER: 0
DYSURIA: 0
VOMITING: 0
CONSTIPATION: 0
ABDOMINAL PAIN: 1
DIARRHEA: 0

## 2024-10-28 ASSESSMENT — PATIENT HEALTH QUESTIONNAIRE - PHQ9: 2. FEELING DOWN, DEPRESSED OR HOPELESS: NOT AT ALL

## 2024-10-28 ASSESSMENT — PAIN SCALES - GENERAL: PAINLEVEL_OUTOF10: 0-NO PAIN

## 2024-10-29 PROBLEM — M21.70 LEG LENGTH DIFFERENCE, ACQUIRED: Status: ACTIVE | Noted: 2024-10-29

## 2024-10-29 PROBLEM — M25.562 ACUTE PAIN OF LEFT KNEE: Status: ACTIVE | Noted: 2024-10-29

## 2024-10-29 PROBLEM — M22.2X2 PATELLOFEMORAL PAIN SYNDROME OF LEFT KNEE: Status: ACTIVE | Noted: 2024-10-29

## 2024-10-29 PROBLEM — M71.22 BAKER'S CYST OF KNEE, LEFT: Status: ACTIVE | Noted: 2024-10-29

## 2024-10-29 PROBLEM — M25.462 EFFUSION OF LEFT KNEE: Status: ACTIVE | Noted: 2024-10-29

## 2024-10-29 PROBLEM — S83.242A ACUTE MEDIAL MENISCAL TEAR, LEFT, INITIAL ENCOUNTER: Status: ACTIVE | Noted: 2024-10-29

## 2024-11-10 ENCOUNTER — HOSPITAL ENCOUNTER (OUTPATIENT)
Dept: RADIOLOGY | Facility: HOSPITAL | Age: 24
Discharge: HOME | End: 2024-11-10
Payer: COMMERCIAL

## 2024-11-10 DIAGNOSIS — M25.462 EFFUSION OF LEFT KNEE: ICD-10-CM

## 2024-11-10 DIAGNOSIS — M22.2X2 PATELLOFEMORAL PAIN SYNDROME OF LEFT KNEE: ICD-10-CM

## 2024-11-10 DIAGNOSIS — M21.70 LEG LENGTH DIFFERENCE, ACQUIRED: ICD-10-CM

## 2024-11-10 DIAGNOSIS — M21.072 VALGUS FOOT DEFORMITY, ACQUIRED, LEFT: ICD-10-CM

## 2024-11-10 DIAGNOSIS — M71.22 BAKER'S CYST OF KNEE, LEFT: ICD-10-CM

## 2024-11-10 DIAGNOSIS — M25.562 ACUTE PAIN OF LEFT KNEE: ICD-10-CM

## 2024-11-10 DIAGNOSIS — S83.242A ACUTE MEDIAL MENISCAL TEAR, LEFT, INITIAL ENCOUNTER: ICD-10-CM

## 2024-11-10 PROCEDURE — 73721 MRI JNT OF LWR EXTRE W/O DYE: CPT | Mod: LT

## 2024-11-10 PROCEDURE — 73721 MRI JNT OF LWR EXTRE W/O DYE: CPT | Mod: LEFT SIDE | Performed by: RADIOLOGY

## 2024-11-11 ENCOUNTER — TELEPHONE (OUTPATIENT)
Dept: SPORTS MEDICINE | Facility: CLINIC | Age: 24
End: 2024-11-11
Payer: COMMERCIAL

## 2024-11-11 ENCOUNTER — PATIENT MESSAGE (OUTPATIENT)
Dept: PRIMARY CARE | Facility: CLINIC | Age: 24
End: 2024-11-11
Payer: COMMERCIAL

## 2024-11-11 NOTE — TELEPHONE ENCOUNTER
Patient called and left message stating that she had seen the MRI report/results in her mychart and was concerned about what they meant. Upon quick review by Dr. Nichols, I called patient back and talked to her that per Simone, no need for surgical intervention and that he would discuss treatment options further at her follow up visit Monday. Also noted that she should schedule physical therapy to get started if she has yet to do so. Patient verbalized and confirmed understanding.

## 2024-11-15 NOTE — PROGRESS NOTES
Verbal consent of the patient and/or verbal parental consent for patients under the age of 18 have been obtained to conduct a physical examination at this office visit.    Established patient  History Of Present Illness  11/18/24 Emelina Parmar is a 24 y.o. female who presents for MRI review of their Left knee. States that her knee is feeling better than she did when last seen. States that she has not yet been to physical therapy. States that is feeling 2/10 knee pain. States that she is not having any new or worsening symptoms. She states that home treatment she is using heat and rest with elevation for pain management. 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  We talked in detail about her MRI results.  I told her that based off of her MRI she has some degenerative changes underneath her kneecap.  I want her to get started into some physical therapy to work on balancing the musculature around the knee.  Also we talked about potentially doing viscosupplementation injections or regenerative injections in the future.      All previous Progress Notes and imaging results related to this patients chief complaint have been reviewed in preparation for this examination.    Past Medical History  She has a past medical history of Acne, Allergic, and Anxiety.    Surgical History  She has a past surgical history that includes [place] tooth extraction.     Social History  She reports that she has never smoked. She has never used smokeless tobacco. She reports that she does not drink alcohol and does not use drugs.    Family History  Family History   Problem Relation Name Age of Onset    No Known Problems Mother      Hypertension Father Thomas Parmar     Hyperlipidemia Father Thomas Parmar     Diabetes Father Thomas Parmar     Heart disease Father Thomas Parmar     Cancer Father Thomas Parmar     Cancer Maternal Grandmother Maternal Grandmother     Hyperlipidemia Other Family Hx      Hypertension Other Family Hx    .    History Of Present Illness  10/29/24 Emelina Parmar is a 23 y.o. female who presents for an evaluation of their Left knee. She was graciously referred by her PCP Elba Cruz PA-C. States that she feels she has a prominent bakers cyst. States this issue has been happening off and on for 2 years. States that when she kneels and squats she will feel pressure, throbbing and tension during flexion. At its worst she states that she has 5/10 pain. She states that all her pain is located posterior knee. She states that she will use advil and Tylenol prn as well as altering ADLs and resting for pain management.  She has been putting ice on the area as well as moist heat this also does not seem to help.  Denies clicking locking or giving out on her. Denies numbness tingling pins and needles sensations. She denies previous history of knee injury. She is a  for [employer].  Her primary care physician has had her in physical therapy over the past 6 to 8 weeks but has not been getting any better so she was referred into the office for further evaluation and workup.     Allergies  Albuterol, Penicillins, Propylene glycol, and Robitussin cough and cold [chlorpheniramine-dextromethorp]    Review of Systems  CONSTITUTIONAL:   Negative for weight change, loss of appetite, fatigue, weakness, fever, chills, night sweats, headaches .           HEENT:   Negative for cold, cough, sore throat, sinus pain, swollen lymph nodes.           OPHTHALMOLOGY:   Negative for diminished vision, blurred vision, loss of vision, double vision.           ALLERGY:   Negative for runny nose, scratchy throat, sinus congestion, rash, facial pressure, nasal congestion, post-nasal drip.           CARDIOLOGY:   Negative for chest pain, palpitations, murmurs, irregular heart beat, shortness of breath, leg edema, dyspnea on exertion, fatigue, dizziness.           RESPIRATORY:    Negative for chest pain, shortness of breath, swelling of the legs, asthma/copd, chest congestion, pain with breathing .           GASTROENTEROLOGY:   Negative for nausea, vomitting, heartburn, constipation, diarrhea, blood in stool, change in bowel habits, black stool.           HEMATOLOGY/LYMPH:   Negative for fatigue, loss of appetitie, easy bruising, easy bleeding, anemia, abnormal bleeding, slow healing.           ENDOCRINOLOGY:   Negative for polyuria, polydipsia, polyphagia, fatigue, weight loss, weight gain, cold intolerance, heat intolerance, diabetes.           MUSCULOSKELETAL:   Positive  for Left knee pain        DERMATOLOGY:   Negative for rash, bruising.           NEUROLOGY:   Negative for tingling, numbness, gait abnormality, paresthesias, weakness, sciatica.        Examination: All findings relatively unchanged since previous visit  Left Posterior knee bakers cyst  Edema: Negative   effusion: Suprapatellar and Infrapatellar MILD  Percussion Test: Negative  Tuning Fork Test: Negative   ecchymosis/Bruising: Negative.            Palpation: Positive  Tender to plapation still over the Left posterior knee; body and posterior horn of Medial Meniscus as well as medial patellar retinaculumAll findings relatively unchanged since previous visit    Orientation: Symmetrical           Range of Motion: All findings relatively unchanged since previous visit  Positive Knee Flexion ( 0-135 degrees) Decreased due to stiffness  Negative Knee Extension ( 0-15 degrees)                 Muscle Strength: All findings relatively unchanged since previous visit  Negative+5/+5 Quadricep Extension  Positive +4/+5 Hamstring Flexion Causes Pain       +5+5 Gastrocnemius  +5+5 Soleus.            Proprioception:  Pain with Squat: Positive   Pain with Sumo Squat: Positive    Hop Test: Negative.           Vascular:   +2/+4 Femoral  +2/+4 Dorsalis Pedis  +2/+4 Posterior Tibial  Capillary Refill less than 2 seconds.            KNEE -  PATELLA:All findings relatively unchanged since previous visit  Apprehension Test: Positive    Glide Test: Positive    Grind Test: NPositive  .  Patella Tracking Test: Positive      KNEE - MENISCUS:All findings relatively unchanged since previous visit  Apley Compression Test: Positive  Medial Joint Line Pain.   Stienmann Test: Positive  Medial Joint Line Pain.   Richard Test: Positive  Medial Joint Line Pain.   Bounce Home Test: Positive  Medial Joint Line Pain.   Thessaly Test:  Negative        KNEE - POPLITEUS:  Arun's Test: Positive      Imaging and Diagnostics Review:  MR knee left wo IV contrast  Order date: 11/10/2024  Authorizing: Anisha Nichols DO  Ordered by Anisha Nichols DO on 11/10/2024.     Narrative & Impression    Interpreted By:  Vanessa Diaz,   STUDY:  MR KNEE LEFT WO IV CONTRAST; ;  11/10/2024 2:09 pm      INDICATION:  Signs/Symptoms:LEFT KNEE PAIN.      ,M71.22 Synovial cyst of popliteal space (Baker), left knee,M25.562  Pain in left knee,M25.462 Effusion, left knee,S83.242A Other tear of  medial meniscus, current injury, left knee, initial encounter,M21.70  Unequal limb length (acquired), unspecified site,M21.072 Valgus  deformity, not elsewhere classified, left ankle,M22.2X2  Patellofemoral disorders, left knee      COMPARISON:  10/25/2024      ACCESSION NUMBER(S):  [id number]      ORDERING CLINICIAN:  ANISHA NICHOLS      TECHNIQUE:  Multiplanar multisequence MRI obtained of the left knee      FINDINGS:  Anterior cruciate ligament is unremarkable      Posterior cruciate ligament is unremarkable      Lateral meniscus is unremarkable      Medial meniscus is unremarkable      Medial collateral ligament complex is unremarkable      Lateral supporting structures are intact. There is no edema in the  posterolateral corner.      Extensor mechanism is intact. Patellofemoral articulation  demonstrates focal full-thickness fissuring of the articular  cartilage within the medial facet of the  patella about the apex  inferiorly (series 5, image 19). No reactive marrow edema  demonstrated. Trochlea demonstrates no fissuring or cartilage defect      Lateral femorotibial compartment is unremarkable. Medial femorotibial  compartment demonstrates no fissuring or cartilage defect      There is no knee joint effusion. No intra-articular bodies are  demonstrated.      IMPRESSION:  1. Focal full-thickness fissuring of the articular cartilage within  the medial facet of the patella about the apex inferiorly. No  reactive marrow edema demonstrated  2. No meniscal tear.        Signed by: Vanessa Diaz 11/11/2024 9:39 AM  Dictation workstation:   [id number]       Assessment   1. Baker's cyst of knee, left        2. Acute pain of left knee        3. Effusion of left knee        4. Acute medial meniscal tear, left, initial encounter        5. Leg length difference, acquired        6. Valgus foot deformity, acquired, left        7. Patellofemoral pain syndrome of left knee              Treatment or Intervention:  Continue to alternate ice and moist heat as needed  ,   Start physical Therapy 1-2 times a week for 8-10 weeks with manual therapy as well as dry needling and IASTM  , especially work on strengthening of VMO and adductors  Stressed the importance of wearing shoes with good stability control to help with the biomechanics affecting the knees as well as the lower extremities  ,   Stressed the importance of wearing full foot insoles to help with the biomechanics affecting the knees as well as the lower extremities,   In the future  recommendation over-the-counter  vitamin-D 2 -3000+ milligrams a day, as well as  a daily multivitamin.  ,   In the future  recommendation over-the-counter curcumin, turmeric, boswellia, as well as egg shell membrane as directed to aid with joint inflammation.  ,   In the future  recommendation over-the-counter Move Free for joint health.  ,   Patient advised regarding the risks  and/or potential adverse reactions and/or side effects of any prescribed medications along with any over-the-counter medications or any supplements used. Patient advised to seek immediate medical care if any adverse reactions occur. The patient and/or patient(s) parent(s) verbalized their understanding,   Discussed in detail with the patient to the level of their understanding the possibility in the future of regenerative injections versus corticosteroid injections versus viscosupplementation injections versus a combination  Follow-up 8 weeks or sooner if needed    Please note that this report has been produced using speech recognition software.  It may contain errors related to grammar, punctuation or spelling.  Electronically signed, but not reviewed.  DAYA Irving, Director of Sports Medicine    DANIELLA WHITEHEAD on 11/18/24 at 4:33 PM.     MARGO Irving DO

## 2024-11-18 ENCOUNTER — OFFICE VISIT (OUTPATIENT)
Dept: SPORTS MEDICINE | Facility: CLINIC | Age: 24
End: 2024-11-18
Payer: COMMERCIAL

## 2024-11-18 VITALS
BODY MASS INDEX: 26.58 KG/M2 | WEIGHT: 150 LBS | HEIGHT: 63 IN | HEART RATE: 70 BPM | DIASTOLIC BLOOD PRESSURE: 74 MMHG | SYSTOLIC BLOOD PRESSURE: 122 MMHG

## 2024-11-18 DIAGNOSIS — M21.072 VALGUS FOOT DEFORMITY, ACQUIRED, LEFT: ICD-10-CM

## 2024-11-18 DIAGNOSIS — M25.462 EFFUSION OF LEFT KNEE: ICD-10-CM

## 2024-11-18 DIAGNOSIS — M71.22 BAKER'S CYST OF KNEE, LEFT: ICD-10-CM

## 2024-11-18 DIAGNOSIS — M21.70 LEG LENGTH DIFFERENCE, ACQUIRED: ICD-10-CM

## 2024-11-18 DIAGNOSIS — M17.12 PRIMARY OSTEOARTHRITIS OF LEFT KNEE: Primary | ICD-10-CM

## 2024-11-18 DIAGNOSIS — M22.2X2 PATELLOFEMORAL PAIN SYNDROME OF LEFT KNEE: ICD-10-CM

## 2024-11-18 DIAGNOSIS — M25.562 ACUTE PAIN OF LEFT KNEE: ICD-10-CM

## 2024-11-18 DIAGNOSIS — M22.42 CHONDROMALACIA OF LEFT PATELLA: ICD-10-CM

## 2024-11-18 PROCEDURE — 3008F BODY MASS INDEX DOCD: CPT | Performed by: FAMILY MEDICINE

## 2024-11-18 PROCEDURE — 1036F TOBACCO NON-USER: CPT | Performed by: FAMILY MEDICINE

## 2024-11-18 PROCEDURE — 99214 OFFICE O/P EST MOD 30 MIN: CPT | Performed by: FAMILY MEDICINE

## 2024-11-18 ASSESSMENT — PATIENT HEALTH QUESTIONNAIRE - PHQ9
2. FEELING DOWN, DEPRESSED OR HOPELESS: NOT AT ALL
SUM OF ALL RESPONSES TO PHQ9 QUESTIONS 1 AND 2: 0
1. LITTLE INTEREST OR PLEASURE IN DOING THINGS: NOT AT ALL

## 2024-11-18 ASSESSMENT — PAIN SCALES - GENERAL
PAINLEVEL_OUTOF10: 2
PAINLEVEL_OUTOF10: 2

## 2024-11-18 ASSESSMENT — ENCOUNTER SYMPTOMS
OCCASIONAL FEELINGS OF UNSTEADINESS: 0
DEPRESSION: 0
LOSS OF SENSATION IN FEET: 0

## 2024-11-18 ASSESSMENT — COLUMBIA-SUICIDE SEVERITY RATING SCALE - C-SSRS
6. HAVE YOU EVER DONE ANYTHING, STARTED TO DO ANYTHING, OR PREPARED TO DO ANYTHING TO END YOUR LIFE?: NO
2. HAVE YOU ACTUALLY HAD ANY THOUGHTS OF KILLING YOURSELF?: NO
1. IN THE PAST MONTH, HAVE YOU WISHED YOU WERE DEAD OR WISHED YOU COULD GO TO SLEEP AND NOT WAKE UP?: NO

## 2024-11-18 ASSESSMENT — PAIN DESCRIPTION - DESCRIPTORS: DESCRIPTORS: ACHING;SORE

## 2024-11-18 ASSESSMENT — PAIN - FUNCTIONAL ASSESSMENT: PAIN_FUNCTIONAL_ASSESSMENT: 0-10

## 2024-11-18 NOTE — PATIENT INSTRUCTIONS
Continue to alternate ice and moist heat as needed  ,   Start physical Therapy 1-2 times a week for 8-10 weeks with manual therapy as well as dry needling and IASTM  ,  Stressed the importance of wearing shoes with good stability control to help with the biomechanics affecting the knees as well as the lower extremities  ,   Stressed the importance of wearing full foot insoles to help with the biomechanics affecting the knees as well as the lower extremities,   In the future  recommendation over-the-counter  vitamin-D 2 -3000+ milligrams a day, as well as  a daily multivitamin.  ,   In the future  recommendation over-the-counter curcumin, turmeric, boswellia, as well as egg shell membrane as directed to aid with joint inflammation.  ,   In the future  recommendation over-the-counter Move Free for joint health.  ,   Patient advised regarding the risks and/or potential adverse reactions and/or side effects of any prescribed medications along with any over-the-counter medications or any supplements used. Patient advised to seek immediate medical care if any adverse reactions occur. The patient and/or patient(s) parent(s) verbalized their understanding,   Discussed in detail with the patient to the level of their understanding the possibility in the future of regenerative injections versus corticosteroid injections versus viscosupplementation injections versus a combination  Follow-up 8 weeks

## 2024-12-18 PROCEDURE — 87624 HPV HI-RISK TYP POOLED RSLT: CPT

## 2024-12-18 PROCEDURE — 88175 CYTOPATH C/V AUTO FLUID REDO: CPT

## 2024-12-18 PROCEDURE — 88141 CYTOPATH C/V INTERPRET: CPT | Performed by: PATHOLOGY

## 2024-12-19 ENCOUNTER — LAB REQUISITION (OUTPATIENT)
Dept: LAB | Facility: HOSPITAL | Age: 24
End: 2024-12-19
Payer: COMMERCIAL

## 2024-12-19 DIAGNOSIS — Z11.51 ENCOUNTER FOR SCREENING FOR HUMAN PAPILLOMAVIRUS (HPV): ICD-10-CM

## 2024-12-19 DIAGNOSIS — Z01.419 ENCOUNTER FOR GYNECOLOGICAL EXAMINATION (GENERAL) (ROUTINE) WITHOUT ABNORMAL FINDINGS: ICD-10-CM

## 2025-01-09 LAB
CYTOLOGY CMNT CVX/VAG CYTO-IMP: NORMAL
HPV HR 12 DNA GENITAL QL NAA+PROBE: NEGATIVE
HPV HR GENOTYPES PNL CVX NAA+PROBE: NEGATIVE
HPV16 DNA SPEC QL NAA+PROBE: NEGATIVE
HPV18 DNA SPEC QL NAA+PROBE: NEGATIVE
LAB AP HPV GENOTYPE QUESTION: YES
LAB AP HPV HR: NORMAL
LABORATORY COMMENT REPORT: NORMAL
LMP START DATE: NORMAL
PATH REPORT.TOTAL CANCER: NORMAL
RESIDENT REVIEW: NORMAL

## 2025-01-20 ENCOUNTER — APPOINTMENT (OUTPATIENT)
Dept: SPORTS MEDICINE | Facility: CLINIC | Age: 25
End: 2025-01-20
Payer: COMMERCIAL

## 2025-04-24 ASSESSMENT — DERMATOLOGY QUALITY OF LIFE (QOL) ASSESSMENT
RATE HOW BOTHERED YOU ARE BY SYMPTOMS OF YOUR SKIN PROBLEM (EG, ITCHING, STINGING BURNING, HURTING OR SKIN IRRITATION): 1
RATE HOW EMOTIONALLY BOTHERED YOU ARE BY YOUR SKIN PROBLEM (FOR EXAMPLE, WORRY, EMBARRASSMENT, FRUSTRATION): 4
WHAT SINGLE SKIN CONDITION LISTED BELOW IS THE PATIENT ANSWERING THE QUALITY-OF-LIFE ASSESSMENT QUESTIONS ABOUT: ACNE
WHAT SINGLE SKIN CONDITION LISTED BELOW IS THE PATIENT ANSWERING THE QUALITY-OF-LIFE ASSESSMENT QUESTIONS ABOUT: ACNE
RATE HOW EMOTIONALLY BOTHERED YOU ARE BY YOUR SKIN PROBLEM (FOR EXAMPLE, WORRY, EMBARRASSMENT, FRUSTRATION): 4
RATE HOW BOTHERED YOU ARE BY SYMPTOMS OF YOUR SKIN PROBLEM (EG, ITCHING, STINGING BURNING, HURTING OR SKIN IRRITATION): 1
RATE HOW BOTHERED YOU ARE BY EFFECTS OF YOUR SKIN PROBLEMS ON YOUR ACTIVITIES (EG, GOING OUT, ACCOMPLISHING WHAT YOU WANT, WORK ACTIVITIES OR YOUR RELATIONSHIPS WITH OTHERS): 0 - NEVER BOTHERED
RATE HOW BOTHERED YOU ARE BY EFFECTS OF YOUR SKIN PROBLEMS ON YOUR ACTIVITIES (EG, GOING OUT, ACCOMPLISHING WHAT YOU WANT, WORK ACTIVITIES OR YOUR RELATIONSHIPS WITH OTHERS): 0 - NEVER BOTHERED
DATE THE QUALITY-OF-LIFE ASSESSMENT WAS COMPLETED: 67319

## 2025-04-25 ENCOUNTER — APPOINTMENT (OUTPATIENT)
Dept: DERMATOLOGY | Facility: CLINIC | Age: 25
End: 2025-04-25
Payer: COMMERCIAL

## 2025-04-25 DIAGNOSIS — L70.0 ACNE VULGARIS: ICD-10-CM

## 2025-04-25 PROCEDURE — 1036F TOBACCO NON-USER: CPT | Performed by: NURSE PRACTITIONER

## 2025-04-25 PROCEDURE — 99213 OFFICE O/P EST LOW 20 MIN: CPT | Performed by: NURSE PRACTITIONER

## 2025-04-25 RX ORDER — TRETINOIN 0.5 MG/G
CREAM TOPICAL
Qty: 45 G | Refills: 2 | Status: SHIPPED | OUTPATIENT
Start: 2025-04-25

## 2025-04-25 NOTE — PROGRESS NOTES
Subjective     Emelina Parmar is a 24 y.o. female who presents for the following: Acne.     Review of Systems:  No other skin or systemic complaints other than what is documented elsewhere in the note.    The following portions of the chart were reviewed this encounter and updated as appropriate:   Tobacco  Allergies  Meds  Problems  Med Hx  Surg Hx         Skin Cancer History  Biopsy Log Book  No skin cancers from Specimen Tracking.    Additional History      Specialty Problems    None       Objective   Well appearing patient in no apparent distress; mood and affect are within normal limits.    A focused skin examination was performed. All findings within normal limits unless otherwise noted below.    Assessment/Plan   Skin Exam  1. ACNE VULGARIS  Head - Anterior (Face)  Scattered open and closed comedomes to the chin area. No papulopustules or red papules.   This is a 23 y.o. female  following up for acne. Much improved but still has comedomes. Tolerating SS wash rinse after 1 minute and tretinoin 0.025% nightly. Will increase tretinoin to 0.05% cream nightly. She will notify me if experiencing any irritation.     PLAN    PM - Wash with SS wash. Rinse after 3 minutes then apply moisturizing cream and then  tretinoin 0.05% cream daily.   Related Procedures  Follow Up In Dermatology - Established Patient  Related Medications  sulfacetamide sodium-sulfur (Avar, Plexion) 10-5 % (w/w) topical emulsion  Wash affected areas twice daily rinse after 5 minutes  tretinoin (Retin-A) 0.05 % cream  Apply a thin layer to affected area at bedtime as directed    Return in 3  months for acne or return to clinic sooner if needed

## 2025-04-25 NOTE — Clinical Note
This is a 23 y.o. female  following up for acne. Much improved but still has comedomes. Tolerating SS wash rinse after 1 minute and tretinoin 0.025% nightly. Will increase tretinoin to 0.05% cream nightly. She will notify me if experiencing any irritation.     PLAN    PM - Wash with SS wash. Rinse after 3 minutes then apply moisturizing cream and then  tretinoin 0.05% cream daily.

## 2025-05-14 DIAGNOSIS — F41.1 GAD (GENERALIZED ANXIETY DISORDER): ICD-10-CM

## 2025-05-14 RX ORDER — SERTRALINE HYDROCHLORIDE 100 MG/1
100 TABLET, FILM COATED ORAL DAILY
Qty: 90 TABLET | Refills: 1 | Status: SHIPPED | OUTPATIENT
Start: 2025-05-14

## 2025-06-17 ENCOUNTER — APPOINTMENT (OUTPATIENT)
Dept: PRIMARY CARE | Facility: CLINIC | Age: 25
End: 2025-06-17
Payer: COMMERCIAL

## 2025-06-18 ENCOUNTER — TELEPHONE (OUTPATIENT)
Dept: PRIMARY CARE | Facility: CLINIC | Age: 25
End: 2025-06-18
Payer: COMMERCIAL

## 2025-07-18 ASSESSMENT — DERMATOLOGY QUALITY OF LIFE (QOL) ASSESSMENT
WHAT SINGLE SKIN CONDITION LISTED BELOW IS THE PATIENT ANSWERING THE QUALITY-OF-LIFE ASSESSMENT QUESTIONS ABOUT: ACNE
WHAT SINGLE SKIN CONDITION LISTED BELOW IS THE PATIENT ANSWERING THE QUALITY-OF-LIFE ASSESSMENT QUESTIONS ABOUT: ACNE
RATE HOW BOTHERED YOU ARE BY SYMPTOMS OF YOUR SKIN PROBLEM (EG, ITCHING, STINGING BURNING, HURTING OR SKIN IRRITATION): 3
RATE HOW EMOTIONALLY BOTHERED YOU ARE BY YOUR SKIN PROBLEM (FOR EXAMPLE, WORRY, EMBARRASSMENT, FRUSTRATION): 3
RATE HOW BOTHERED YOU ARE BY SYMPTOMS OF YOUR SKIN PROBLEM (EG, ITCHING, STINGING BURNING, HURTING OR SKIN IRRITATION): 3
RATE HOW EMOTIONALLY BOTHERED YOU ARE BY YOUR SKIN PROBLEM (FOR EXAMPLE, WORRY, EMBARRASSMENT, FRUSTRATION): 3
RATE HOW BOTHERED YOU ARE BY EFFECTS OF YOUR SKIN PROBLEMS ON YOUR ACTIVITIES (EG, GOING OUT, ACCOMPLISHING WHAT YOU WANT, WORK ACTIVITIES OR YOUR RELATIONSHIPS WITH OTHERS): 0 - NEVER BOTHERED
RATE HOW BOTHERED YOU ARE BY EFFECTS OF YOUR SKIN PROBLEMS ON YOUR ACTIVITIES (EG, GOING OUT, ACCOMPLISHING WHAT YOU WANT, WORK ACTIVITIES OR YOUR RELATIONSHIPS WITH OTHERS): 0 - NEVER BOTHERED

## 2025-07-25 ENCOUNTER — APPOINTMENT (OUTPATIENT)
Dept: DERMATOLOGY | Facility: CLINIC | Age: 25
End: 2025-07-25
Payer: COMMERCIAL

## 2025-07-25 DIAGNOSIS — L70.0 ACNE VULGARIS: ICD-10-CM

## 2025-07-25 PROCEDURE — 1036F TOBACCO NON-USER: CPT | Performed by: NURSE PRACTITIONER

## 2025-07-25 PROCEDURE — 99213 OFFICE O/P EST LOW 20 MIN: CPT | Performed by: NURSE PRACTITIONER

## 2025-07-25 NOTE — PROGRESS NOTES
Subjective     Emelina Parmar is a 24 y.o. female who presents for the following: Acne.     Review of Systems:  No other skin or systemic complaints other than what is documented elsewhere in the note.    The following portions of the chart were reviewed this encounter and updated as appropriate:   Tobacco  Allergies  Meds  Problems  Med Hx  Surg Hx         Skin Cancer History  Biopsy Log Book  No skin cancers from Specimen Tracking.    Additional History      Specialty Problems    None       Objective   Well appearing patient in no apparent distress; mood and affect are within normal limits.    A focused skin examination was performed. All findings within normal limits unless otherwise noted below.    Assessment/Plan   Skin Exam  1. ACNE VULGARIS  Head - Anterior (Face)  Scattered open and closed comedomes to the chin and cheek area.1 red papule  This is a 23 y.o. female  following up for acne. Much improved, near clear, but still has a few comedomes. Tolerating SS wash rinse after 1 minute and tretinoin 0.05% cream every other night at bedtime.  Will increase tretinoin to 0.05% cream by 1 day every 3-4 weeks as long as she is tolerating until she is able to apply daily. Notify me if any issues.  She will notify me if experiencing any irritation. Continue with SS wash once daily. Call for RF.        This Visit  - Follow Up In Dermatology - Established Patient  Existing Treatments  - sulfacetamide sodium-sulfur (Avar, Plexion) 10-5 % (w/w) topical emulsion - Wash affected areas twice daily rinse after 5 minutes  - tretinoin (Retin-A) 0.05 % cream - Apply a thin layer to affected area at bedtime as directed    Return in 6 months for routine  check or return to clinic sooner if needed

## 2025-07-25 NOTE — Clinical Note
Scattered open and closed comedomes to the chin and cheek area.1 red papule  
This is a 23 y.o. female  following up for acne. Much improved, near clear, but still has a few comedomes. Tolerating SS wash rinse after 1 minute and tretinoin 0.05% cream every other night at bedtime.  Will increase tretinoin to 0.05% cream by 1 day every 3-4 weeks as long as she is tolerating until she is able to apply daily. Notify me if any issues.  She will notify me if experiencing any irritation. Continue with SS wash once daily. Call for RF.        
no

## 2025-08-07 ENCOUNTER — OFFICE VISIT (OUTPATIENT)
Dept: PRIMARY CARE | Facility: CLINIC | Age: 25
End: 2025-08-07
Payer: COMMERCIAL

## 2025-08-07 VITALS
OXYGEN SATURATION: 99 % | HEART RATE: 80 BPM | BODY MASS INDEX: 28.17 KG/M2 | SYSTOLIC BLOOD PRESSURE: 118 MMHG | WEIGHT: 159 LBS | HEIGHT: 63 IN | DIASTOLIC BLOOD PRESSURE: 70 MMHG

## 2025-08-07 DIAGNOSIS — F41.1 GAD (GENERALIZED ANXIETY DISORDER): Primary | ICD-10-CM

## 2025-08-07 DIAGNOSIS — H60.8X3 CHRONIC ECZEMATOUS OTITIS EXTERNA OF BOTH EARS: ICD-10-CM

## 2025-08-07 PROCEDURE — 3008F BODY MASS INDEX DOCD: CPT | Performed by: FAMILY MEDICINE

## 2025-08-07 PROCEDURE — 99213 OFFICE O/P EST LOW 20 MIN: CPT | Performed by: FAMILY MEDICINE

## 2025-08-07 RX ORDER — SERTRALINE HYDROCHLORIDE 100 MG/1
100 TABLET, FILM COATED ORAL DAILY
Qty: 90 TABLET | Refills: 1 | Status: SHIPPED | OUTPATIENT
Start: 2025-08-07 | End: 2025-11-05

## 2025-08-07 RX ORDER — HYDROCORTISONE AND ACETIC ACID 20.75; 10.375 MG/ML; MG/ML
3 SOLUTION AURICULAR (OTIC) 2 TIMES DAILY
Qty: 10 ML | Refills: 1 | Status: SHIPPED | OUTPATIENT
Start: 2025-08-07 | End: 2025-08-17

## 2025-08-07 RX ORDER — SERTRALINE HYDROCHLORIDE 25 MG/1
25 TABLET, FILM COATED ORAL DAILY
Qty: 90 TABLET | Refills: 1 | Status: SHIPPED | OUTPATIENT
Start: 2025-08-07 | End: 2025-11-05

## 2025-08-07 ASSESSMENT — PAIN SCALES - GENERAL: PAINLEVEL_OUTOF10: 0-NO PAIN

## 2025-08-07 NOTE — PROGRESS NOTES
"Subjective   Patient ID: Emelina Parmar is a 24 y.o. female who presents for Follow-up.    Anxiety - started on sertraline in the past, titrated up to 125mg. Feels like anxiety is much better, even the week before her period. Has tried naturopathic treatment in the past from Dr. Oseas leong, silica, kava kava. Last seen 9/19/24, had dose increased to 100mg. Feeling much better w/increase dose. Denies any side effects, feels stable on her current dose.    Having itching and irritation in her ear canals- no drainage, no pain, no hearing loss         Review of Systems   Constitutional:  Positive for fatigue. Negative for chills and fever.   Respiratory:  Negative for shortness of breath and wheezing.    Cardiovascular:  Negative for chest pain, palpitations and leg swelling.   Gastrointestinal:  Negative for constipation, nausea and vomiting.   Genitourinary:  Negative for frequency and hematuria.   Musculoskeletal:  Negative for arthralgias and myalgias.   Skin:  Negative for rash.   Neurological:  Negative for dizziness and weakness.   Psychiatric/Behavioral:  Negative for confusion. The patient is not nervous/anxious.        Objective   /70   Pulse 80   Ht 1.6 m (5' 3\")   Wt 72.1 kg (159 lb)   SpO2 99%   BMI 28.17 kg/m²     Physical Exam  Vitals reviewed.   Constitutional:       General: She is not in acute distress.     Appearance: Normal appearance. She is not ill-appearing.   HENT:      Right Ear: Tympanic membrane normal.      Left Ear: Tympanic membrane normal.      Ears:      Comments: Bilateral ear canals with slight dry skin/ eczema     Nose: No rhinorrhea.     Eyes:      General:         Right eye: No discharge.         Left eye: No discharge.       Cardiovascular:      Rate and Rhythm: Normal rate and regular rhythm.      Heart sounds: Normal heart sounds. No murmur heard.  Pulmonary:      Breath sounds: Normal breath sounds. No wheezing or rhonchi.   Abdominal:      General: Bowel sounds " are normal.     Neurological:      General: No focal deficit present.      Mental Status: She is alert and oriented to person, place, and time.     Psychiatric:         Mood and Affect: Mood normal.         Behavior: Behavior normal.         Assessment/Plan   Problem List Items Addressed This Visit           ICD-10-CM    ONEAL (generalized anxiety disorder) - Primary F41.1    Relevant Medications    sertraline (Zoloft) 25 mg tablet    sertraline (Zoloft) 100 mg tablet     Other Visit Diagnoses         Codes      Chronic eczematous otitis externa of both ears     H60.8X3    trial volsol drops     Relevant Medications    hydrocortisone-acetic acid (Vosol-HC) otic solution

## 2025-08-09 ASSESSMENT — ENCOUNTER SYMPTOMS
HEMATURIA: 0
FATIGUE: 1
NERVOUS/ANXIOUS: 0
CONFUSION: 0
DIZZINESS: 0
FREQUENCY: 0
PALPITATIONS: 0
VOMITING: 0
WEAKNESS: 0
MYALGIAS: 0
SHORTNESS OF BREATH: 0
FEVER: 0
CONSTIPATION: 0
WHEEZING: 0
NAUSEA: 0
ARTHRALGIAS: 0
CHILLS: 0

## 2025-08-12 DIAGNOSIS — L70.0 ACNE VULGARIS: ICD-10-CM

## 2025-08-13 RX ORDER — SULFACETAMIDE SODIUM, SULFUR 100; 50 MG/G; MG/G
EMULSION TOPICAL
Qty: 340 G | Refills: 2 | Status: SHIPPED | OUTPATIENT
Start: 2025-08-13

## 2025-09-18 ENCOUNTER — APPOINTMENT (OUTPATIENT)
Dept: OTOLARYNGOLOGY | Facility: CLINIC | Age: 25
End: 2025-09-18
Payer: COMMERCIAL

## 2025-12-19 ENCOUNTER — APPOINTMENT (OUTPATIENT)
Dept: PRIMARY CARE | Facility: CLINIC | Age: 25
End: 2025-12-19
Payer: COMMERCIAL

## 2026-01-26 ENCOUNTER — APPOINTMENT (OUTPATIENT)
Dept: DERMATOLOGY | Facility: CLINIC | Age: 26
End: 2026-01-26
Payer: COMMERCIAL